# Patient Record
Sex: MALE | Race: WHITE | URBAN - METROPOLITAN AREA
[De-identification: names, ages, dates, MRNs, and addresses within clinical notes are randomized per-mention and may not be internally consistent; named-entity substitution may affect disease eponyms.]

---
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
## 2025-02-13 NOTE — PROGRESS NOTES
HPI:    Patient ID: Cain Little is a 17year old male. HPI  Patient  Is here for his 9th grade physical/sports physical.  Patient denies complaints. Is generally healthy, no concerns  Immunizations are up to date.    Review of Systems   Constituti Ok for pt referral.    heard.  Pulses:       Dorsalis pedis pulses are 2+ on the right side, and 2+ on the left side. Pulmonary/Chest: Effort normal and breath sounds normal.   Abdominal: Soft. Bowel sounds are normal. He exhibits no mass. There is no hepatosplenomegaly.  There

## (undated) NOTE — MR AVS SNAPSHOT
[address] [place]  [address] [id number] [phone]               Thank you for choosing us for your health care visit with [facility].   We are glad to serve you and happy to provide you with this summary o Generic drug:  Albuterol Sulfate HFA                   [place]     Sign up for [employer] access for your child. [employer] access allows you to view health information for your child from their recent   visit, view other health information and more.   To sign u o grow a family garden    In addition to 11, 4, 3, 2, 1 families can make small changes in their family routines to help everyone lead healthier active lives.  Try:  o Eating breakfast everyday  o Eating low-fat dairy products like yogurt, milk, and cheese